# Patient Record
Sex: MALE | Race: WHITE | NOT HISPANIC OR LATINO | Employment: OTHER | URBAN - METROPOLITAN AREA
[De-identification: names, ages, dates, MRNs, and addresses within clinical notes are randomized per-mention and may not be internally consistent; named-entity substitution may affect disease eponyms.]

---

## 2024-02-26 RX ORDER — SILDENAFIL 100 MG/1
TABLET, FILM COATED ORAL
Qty: 18 TABLET | Refills: 3 | OUTPATIENT
Start: 2024-02-26

## 2024-08-27 ENCOUNTER — HOSPITAL ENCOUNTER (EMERGENCY)
Facility: HOSPITAL | Age: 74
Discharge: HOME | End: 2024-08-27
Attending: STUDENT IN AN ORGANIZED HEALTH CARE EDUCATION/TRAINING PROGRAM
Payer: MEDICARE

## 2024-08-27 ENCOUNTER — APPOINTMENT (OUTPATIENT)
Dept: RADIOLOGY | Facility: HOSPITAL | Age: 74
End: 2024-08-27
Payer: MEDICARE

## 2024-08-27 VITALS
BODY MASS INDEX: 30.66 KG/M2 | TEMPERATURE: 97.2 F | OXYGEN SATURATION: 94 % | HEIGHT: 69 IN | WEIGHT: 207 LBS | RESPIRATION RATE: 16 BRPM | HEART RATE: 67 BPM | DIASTOLIC BLOOD PRESSURE: 83 MMHG | SYSTOLIC BLOOD PRESSURE: 118 MMHG

## 2024-08-27 DIAGNOSIS — R29.6 RECURRENT FALLS: ICD-10-CM

## 2024-08-27 DIAGNOSIS — M25.552 LEFT HIP PAIN: Primary | ICD-10-CM

## 2024-08-27 DIAGNOSIS — Z86.69 HISTORY OF PARKINSON'S DISEASE: ICD-10-CM

## 2024-08-27 LAB
ABO GROUP (TYPE) IN BLOOD: NORMAL
ALBUMIN SERPL BCP-MCNC: 3.8 G/DL (ref 3.4–5)
ALP SERPL-CCNC: 79 U/L (ref 33–136)
ALT SERPL W P-5'-P-CCNC: <3 U/L (ref 10–52)
ANION GAP SERPL CALC-SCNC: 8 MMOL/L (ref 10–20)
ANTIBODY SCREEN: NORMAL
APPEARANCE UR: CLEAR
AST SERPL W P-5'-P-CCNC: 11 U/L (ref 9–39)
BASOPHILS # BLD AUTO: 0.05 X10*3/UL (ref 0–0.1)
BASOPHILS NFR BLD AUTO: 0.8 %
BILIRUB SERPL-MCNC: 1.4 MG/DL (ref 0–1.2)
BILIRUB UR STRIP.AUTO-MCNC: NEGATIVE MG/DL
BUN SERPL-MCNC: 16 MG/DL (ref 6–23)
CALCIUM SERPL-MCNC: 8.4 MG/DL (ref 8.6–10.3)
CHLORIDE SERPL-SCNC: 104 MMOL/L (ref 98–107)
CO2 SERPL-SCNC: 29 MMOL/L (ref 21–32)
COLOR UR: YELLOW
CREAT SERPL-MCNC: 0.94 MG/DL (ref 0.5–1.3)
EGFRCR SERPLBLD CKD-EPI 2021: 87 ML/MIN/1.73M*2
EOSINOPHIL # BLD AUTO: 0.09 X10*3/UL (ref 0–0.4)
EOSINOPHIL NFR BLD AUTO: 1.5 %
ERYTHROCYTE [DISTWIDTH] IN BLOOD BY AUTOMATED COUNT: 13.6 % (ref 11.5–14.5)
GLUCOSE SERPL-MCNC: 91 MG/DL (ref 74–99)
GLUCOSE UR STRIP.AUTO-MCNC: NORMAL MG/DL
HCT VFR BLD AUTO: 40.8 % (ref 41–52)
HGB BLD-MCNC: 14 G/DL (ref 13.5–17.5)
HYALINE CASTS #/AREA URNS AUTO: ABNORMAL /LPF
IMM GRANULOCYTES # BLD AUTO: 0.04 X10*3/UL (ref 0–0.5)
IMM GRANULOCYTES NFR BLD AUTO: 0.7 % (ref 0–0.9)
INR PPP: 1.2 (ref 0.9–1.1)
KETONES UR STRIP.AUTO-MCNC: NEGATIVE MG/DL
LEUKOCYTE ESTERASE UR QL STRIP.AUTO: NEGATIVE
LYMPHOCYTES # BLD AUTO: 0.94 X10*3/UL (ref 0.8–3)
LYMPHOCYTES NFR BLD AUTO: 15.7 %
MCH RBC QN AUTO: 32.8 PG (ref 26–34)
MCHC RBC AUTO-ENTMCNC: 34.3 G/DL (ref 32–36)
MCV RBC AUTO: 96 FL (ref 80–100)
MONOCYTES # BLD AUTO: 0.61 X10*3/UL (ref 0.05–0.8)
MONOCYTES NFR BLD AUTO: 10.2 %
MUCOUS THREADS #/AREA URNS AUTO: ABNORMAL /LPF
NEUTROPHILS # BLD AUTO: 4.26 X10*3/UL (ref 1.6–5.5)
NEUTROPHILS NFR BLD AUTO: 71.1 %
NITRITE UR QL STRIP.AUTO: NEGATIVE
NRBC BLD-RTO: 0 /100 WBCS (ref 0–0)
PH UR STRIP.AUTO: 6 [PH]
PLATELET # BLD AUTO: 177 X10*3/UL (ref 150–450)
POTASSIUM SERPL-SCNC: 3.7 MMOL/L (ref 3.5–5.3)
PROT SERPL-MCNC: 6.2 G/DL (ref 6.4–8.2)
PROT UR STRIP.AUTO-MCNC: ABNORMAL MG/DL
PROTHROMBIN TIME: 13.6 SECONDS (ref 9.8–12.8)
RBC # BLD AUTO: 4.27 X10*6/UL (ref 4.5–5.9)
RBC # UR STRIP.AUTO: NEGATIVE /UL
RBC #/AREA URNS AUTO: ABNORMAL /HPF
RH FACTOR (ANTIGEN D): NORMAL
SODIUM SERPL-SCNC: 137 MMOL/L (ref 136–145)
SP GR UR STRIP.AUTO: 1.03
UROBILINOGEN UR STRIP.AUTO-MCNC: ABNORMAL MG/DL
WBC # BLD AUTO: 6 X10*3/UL (ref 4.4–11.3)
WBC #/AREA URNS AUTO: ABNORMAL /HPF

## 2024-08-27 PROCEDURE — 72131 CT LUMBAR SPINE W/O DYE: CPT

## 2024-08-27 PROCEDURE — 72131 CT LUMBAR SPINE W/O DYE: CPT | Mod: FOREIGN READ | Performed by: RADIOLOGY

## 2024-08-27 PROCEDURE — 2500000004 HC RX 250 GENERAL PHARMACY W/ HCPCS (ALT 636 FOR OP/ED): Performed by: STUDENT IN AN ORGANIZED HEALTH CARE EDUCATION/TRAINING PROGRAM

## 2024-08-27 PROCEDURE — 81001 URINALYSIS AUTO W/SCOPE: CPT | Performed by: STUDENT IN AN ORGANIZED HEALTH CARE EDUCATION/TRAINING PROGRAM

## 2024-08-27 PROCEDURE — 73700 CT LOWER EXTREMITY W/O DYE: CPT | Mod: LEFT SIDE | Performed by: RADIOLOGY

## 2024-08-27 PROCEDURE — 85610 PROTHROMBIN TIME: CPT | Performed by: STUDENT IN AN ORGANIZED HEALTH CARE EDUCATION/TRAINING PROGRAM

## 2024-08-27 PROCEDURE — 80053 COMPREHEN METABOLIC PANEL: CPT | Performed by: STUDENT IN AN ORGANIZED HEALTH CARE EDUCATION/TRAINING PROGRAM

## 2024-08-27 PROCEDURE — 86901 BLOOD TYPING SEROLOGIC RH(D): CPT | Performed by: STUDENT IN AN ORGANIZED HEALTH CARE EDUCATION/TRAINING PROGRAM

## 2024-08-27 PROCEDURE — 85025 COMPLETE CBC W/AUTO DIFF WBC: CPT | Performed by: STUDENT IN AN ORGANIZED HEALTH CARE EDUCATION/TRAINING PROGRAM

## 2024-08-27 PROCEDURE — 73700 CT LOWER EXTREMITY W/O DYE: CPT | Mod: LT

## 2024-08-27 PROCEDURE — 73502 X-RAY EXAM HIP UNI 2-3 VIEWS: CPT | Mod: LT

## 2024-08-27 PROCEDURE — 73502 X-RAY EXAM HIP UNI 2-3 VIEWS: CPT | Mod: LEFT SIDE | Performed by: RADIOLOGY

## 2024-08-27 PROCEDURE — 96374 THER/PROPH/DIAG INJ IV PUSH: CPT

## 2024-08-27 PROCEDURE — 36415 COLL VENOUS BLD VENIPUNCTURE: CPT | Performed by: STUDENT IN AN ORGANIZED HEALTH CARE EDUCATION/TRAINING PROGRAM

## 2024-08-27 PROCEDURE — 96375 TX/PRO/DX INJ NEW DRUG ADDON: CPT

## 2024-08-27 PROCEDURE — 99285 EMERGENCY DEPT VISIT HI MDM: CPT | Mod: 25

## 2024-08-27 RX ORDER — METHOCARBAMOL 500 MG/1
500 TABLET, FILM COATED ORAL 2 TIMES DAILY
Qty: 10 TABLET | Refills: 0 | Status: SHIPPED | OUTPATIENT
Start: 2024-08-27 | End: 2024-09-01

## 2024-08-27 RX ORDER — KETOROLAC TROMETHAMINE 30 MG/ML
15 INJECTION, SOLUTION INTRAMUSCULAR; INTRAVENOUS ONCE
Status: COMPLETED | OUTPATIENT
Start: 2024-08-27 | End: 2024-08-27

## 2024-08-27 RX ORDER — FENTANYL CITRATE 50 UG/ML
50 INJECTION, SOLUTION INTRAMUSCULAR; INTRAVENOUS ONCE
Status: COMPLETED | OUTPATIENT
Start: 2024-08-27 | End: 2024-08-27

## 2024-08-27 RX ORDER — ACETAMINOPHEN 325 MG/1
975 TABLET ORAL ONCE
Status: COMPLETED | OUTPATIENT
Start: 2024-08-27 | End: 2024-08-27

## 2024-08-27 ASSESSMENT — PAIN DESCRIPTION - PROGRESSION: CLINICAL_PROGRESSION: NOT CHANGED

## 2024-08-27 ASSESSMENT — COLUMBIA-SUICIDE SEVERITY RATING SCALE - C-SSRS
1. IN THE PAST MONTH, HAVE YOU WISHED YOU WERE DEAD OR WISHED YOU COULD GO TO SLEEP AND NOT WAKE UP?: NO
2. HAVE YOU ACTUALLY HAD ANY THOUGHTS OF KILLING YOURSELF?: NO
6. HAVE YOU EVER DONE ANYTHING, STARTED TO DO ANYTHING, OR PREPARED TO DO ANYTHING TO END YOUR LIFE?: NO

## 2024-08-27 ASSESSMENT — LIFESTYLE VARIABLES
EVER FELT BAD OR GUILTY ABOUT YOUR DRINKING: NO
HAVE YOU EVER FELT YOU SHOULD CUT DOWN ON YOUR DRINKING: NO
HAVE PEOPLE ANNOYED YOU BY CRITICIZING YOUR DRINKING: NO
EVER HAD A DRINK FIRST THING IN THE MORNING TO STEADY YOUR NERVES TO GET RID OF A HANGOVER: NO
TOTAL SCORE: 0

## 2024-08-27 ASSESSMENT — PAIN SCALES - GENERAL
PAINLEVEL_OUTOF10: 3
PAINLEVEL_OUTOF10: 0 - NO PAIN

## 2024-08-27 ASSESSMENT — PAIN - FUNCTIONAL ASSESSMENT: PAIN_FUNCTIONAL_ASSESSMENT: 0-10

## 2024-08-27 NOTE — ED PROVIDER NOTES
"    HPI   Chief Complaint   Patient presents with    Fall     PT STATES THAT HE HAS FELL 3 TIMES TODAY, C/O LEFT HIP AND RIGHT ELBOW PAIN. DENIES HITTING HEAD, 0 LOC       HPI: Patient is a 73-year-old male, he has a history of Parkinson's disease with frequent falls, history of a chronic cough, GERD, House's esophagus, lung nodules, he is presenting to the emergency department after a fall with left hip pain.  Patient reports multiple mechanical falls today onto his left hip.  Did not hit his head or lose consciousness.  Patient is complaining of left hip pain.  Denies any paresthesias.  States he was having too much pain to get up and walk after the last fall.  No chest or abdominal pain, endorses his chronic back pain which is not worse than usual.      ROS: Complete 12 point review of systems performed, otherwise negative except as noted in the history of present illness    PMH: Reviewed, documented below in note. Pertinents in HPI  PSH: Reviewed and documented below in note. Pertinents in HPI  SH: Lives at home with his wife.  No illicits  Fam: Reviewed, noncontributory to patients current complaint  MEDS: Reviewed and documented below in note. Pertinents in HPI  ALLERGIES: Reviewed and documented below in note.          History provided by:  Patient   used: No                          No data recorded                Patient History   No past medical history on file.  No past surgical history on file.  No family history on file.  Social History     Tobacco Use    Smoking status: Not on file    Smokeless tobacco: Not on file   Substance Use Topics    Alcohol use: Not on file    Drug use: Not on file       Physical Exam   Visit Vitals  /83   Pulse 67   Temp 36.2 °C (97.2 °F)   Resp 16   Ht 1.753 m (5' 9\")   Wt 93.9 kg (207 lb)   SpO2 94%   BMI 30.57 kg/m²   BSA 2.14 m²      Physical Exam  Vitals and nursing note reviewed.   Constitutional:       Appearance: Normal appearance.   HENT: "      Head: Normocephalic and atraumatic.   Neck:      Vascular: No carotid bruit.   Cardiovascular:      Rate and Rhythm: Normal rate and regular rhythm.      Pulses: Normal pulses.      Heart sounds: Normal heart sounds.   Pulmonary:      Effort: Pulmonary effort is normal.      Breath sounds: Normal breath sounds.   Abdominal:      General: There is no distension.      Palpations: Abdomen is soft.      Tenderness: There is no abdominal tenderness. There is no guarding or rebound.   Musculoskeletal:         General: Tenderness present. No deformity or signs of injury.      Cervical back: Normal range of motion. No rigidity.      Comments: Patient has tenderness to palpation in the left groin.  Pain with flexion of the left hip.  No pain with internal or external rotation.  Neurovascularly intact distally.   Skin:     General: Skin is warm and dry.      Capillary Refill: Capillary refill takes less than 2 seconds.   Neurological:      General: No focal deficit present.      Mental Status: He is alert and oriented to person, place, and time.      Sensory: No sensory deficit.      Motor: No weakness.   Psychiatric:         Mood and Affect: Mood normal.         Behavior: Behavior normal.         CT hip left wo IV contrast   Final Result   Left hip hemiarthroplasty in good alignment.  No acute bony injury   identified.   Hypertrophic changes of the acetabulum and heterotopic bone adjacent   to the greater trochanter.   Left SI joint arthropathy..   Signed by Wai Garcia MD      CT lumbar spine wo IV contrast   Final Result   Multilevel fusion extending from L3 through S1.  Interbody disc   spacers are in place.  No evidence of hardware complication.    Associated chronic changes in the posterior elements.  No acute   vertebral body fracture or subluxation.   .   Signed by Paul Quick MD      XR hip left with pelvis when performed 2 or 3 views   Final Result   No acute findings.        MACRO:   None         Signed by: Jos Ramirez 8/27/2024 3:43 PM   Dictation workstation:   [id number]          Labs Reviewed   CBC WITH AUTO DIFFERENTIAL - Abnormal       Result Value    WBC 6.0      nRBC 0.0      RBC 4.27 (*)     Hemoglobin 14.0      Hematocrit 40.8 (*)     MCV 96      MCH 32.8      MCHC 34.3      RDW 13.6      Platelets 177      Neutrophils % 71.1      Immature Granulocytes %, Automated 0.7      Lymphocytes % 15.7      Monocytes % 10.2      Eosinophils % 1.5      Basophils % 0.8      Neutrophils Absolute 4.26      Immature Granulocytes Absolute, Automated 0.04      Lymphocytes Absolute 0.94      Monocytes Absolute 0.61      Eosinophils Absolute 0.09      Basophils Absolute 0.05     COMPREHENSIVE METABOLIC PANEL - Abnormal    Glucose 91      Sodium 137      Potassium 3.7      Chloride 104      Bicarbonate 29      Anion Gap 8 (*)     Urea Nitrogen 16      Creatinine 0.94      eGFR 87      Calcium 8.4 (*)     Albumin 3.8      Alkaline Phosphatase 79      Total Protein 6.2 (*)     AST 11      Bilirubin, Total 1.4 (*)     ALT <3 (*)    PROTIME-INR - Abnormal    Protime 13.6 (*)     INR 1.2 (*)    URINALYSIS WITH REFLEX MICROSCOPIC - Abnormal    Color, Urine Yellow      Appearance, Urine Clear      Specific Gravity, Urine 1.027      pH, Urine 6.0      Protein, Urine 20 (TRACE)      Glucose, Urine Normal      Blood, Urine NEGATIVE      Ketones, Urine NEGATIVE      Bilirubin, Urine NEGATIVE      Urobilinogen, Urine 3 (1+) (*)     Nitrite, Urine NEGATIVE      Leukocyte Esterase, Urine NEGATIVE     MICROSCOPIC ONLY, URINE - Abnormal    WBC, Urine NONE      RBC, Urine 1-2      Mucus, Urine 2+      Hyaline Casts, Urine 1+ (*)    TYPE AND SCREEN    ABO TYPE A      Rh TYPE POS      ANTIBODY SCREEN NEG           ED Course & MDM   Diagnoses as of 08/29/24 1029   Left hip pain   Recurrent falls   History of Parkinson's disease           Medical Decision Making  All mentioned lab results, ECGs, and imaging were independently reviewed  by myself  - Patient evaluated. Patient is presenting to the emergency department after a fall, he is having left hip pain.  Differential includes but is not limited to potential fracture, periprosthetic, dislocation, musculoskeletal strain or sprain, or underlying hematoma or bruising.  Imaging was obtained, I do want to obtain labs to rule out infection, dehydration, anemia as a potential cause for the patient's symptoms.  Analgesia was provided here in the emergency department.  X-ray demonstrates no acute findings.  Patient was signed out to the oncoming team pending the results of his labs and a reevaluation of his pain with the x-ray being negative for ultimate disposition.    - Monitored for any changes in stability or symptomatology. Patient remained stable.       *Disclaimer: This note was dictated by speech recognition. Minor errors in transcription may be present. Please call with questions.    Gavin Kim MD             Your medication list        START taking these medications        Instructions Last Dose Given Next Dose Due   methocarbamol 500 mg tablet  Commonly known as: Robaxin      Take 1 tablet (500 mg) by mouth 2 times a day for 5 days.                 Where to Get Your Medications        These medications were sent to [id number] - [place], [place] - [address]  [address] [place] [id number]      Phone: [phone]   methocarbamol 500 mg tablet         Procedure  Procedures     *This report was transcribed using voice recognition software.  Every effort was made to ensure accuracy; however, inadvertent computerized transcription errors may be present.*  Javier Kim MD  08/29/24         Javier Kim MD  08/29/24 5712

## 2024-08-27 NOTE — PROGRESS NOTES
Emergency Medicine Transition of Care Note.    I received Lance Mcgrath in signout from Dr. Kim.  Please see the previous ED provider note for all HPI, PE and MDM up to the time of signout. This is in addition to the primary record.    In brief Lance Mcgrath is an 73 y.o. male presenting for fall prior to arrival.  Patient with history of Parkinson's, has reportedly had multiple falls recently.   mechanical fall earlier today onto the left hip, continued pain with range of motion.  X-ray negative for any acute fracture.  Patient given analgesia, and will attempt to ambulate.  If unable to ambulate, will CT head to rule out occult fracture.  Lab workup also initiated to rule out electrode abnormality, underlying infection, UA pending at this time.  Chief Complaint   Patient presents with    Fall     PT STATES THAT HE HAS FELL 3 TIMES TODAY, C/O LEFT HIP AND RIGHT ELBOW PAIN. DENIES HITTING HEAD, 0 LOC        Diagnoses as of 08/28/24 0236   Left hip pain   Recurrent falls   History of Parkinson's disease       Medical Decision Making  Patient endorsing continued inability to ambulate after XR showing no acute fracture, CT hip and lumbar spine obtained to rule out occult fracture. No acute traumatic injury identified on CT hip or lumbar spine. Patient given analgesia and able to bear weight on that hip. Patient offered nursing facility placement via social work in the morning given reported pain with ambulation, and initially patient and wife were agreeable to placement, however after several repeat discussions at their request, he and his wife feel they have adequate equipment at home and would prefer to be discharged. Patient's wife states they have equipment including rollator, walker, and electronic stair chair at home that patient can use to assist with ambulation if needed. There are no steps to get into the house. Patient ambulated around the department with a walker with a steady gait without other  mobility aid. Will discharge with follow up and strict return precautions. Patient and wife voiced understanding and agreement with plan.    Final diagnoses:   None       Marcelina Machado MD

## 2024-08-28 NOTE — DISCHARGE INSTRUCTIONS
You were seen in the emergency department with left hip pain after recurrent falls.  Your x-ray and CT showed no evidence of any new fractures.  We offered placement in a nursing facility, however you indicated that you have sufficient equipment and care at home at this time.  We recommend using a walker and other supportive devices for ambulation at all times. Please follow up with your primary care provider in the next few days for reevaluation.    Please do not drive or operate any heavy machinery within 8 hours of taking the medication you were prescribed with.  You may use lidocaine patches on the area you are having most pain, and leave them on for 12 hours at a time followed by 12 hours off.    If you feel unsteady on your feet or have continued falls, or wish to be placed in a nursing facility, please return to the emergency department.

## 2024-12-25 ENCOUNTER — APPOINTMENT (OUTPATIENT)
Dept: RADIOLOGY | Facility: HOSPITAL | Age: 74
End: 2024-12-25
Payer: MEDICARE

## 2024-12-25 ENCOUNTER — HOSPITAL ENCOUNTER (EMERGENCY)
Facility: HOSPITAL | Age: 74
Discharge: HOME | End: 2024-12-26
Attending: STUDENT IN AN ORGANIZED HEALTH CARE EDUCATION/TRAINING PROGRAM
Payer: MEDICARE

## 2024-12-25 DIAGNOSIS — Z86.69 HISTORY OF PARKINSON'S DISEASE: ICD-10-CM
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
## 2024-12-25 LAB
ALBUMIN SERPL BCP-MCNC: 3.7 G/DL (ref 3.4–5)
ALP SERPL-CCNC: 92 U/L (ref 33–136)
ALT SERPL W P-5'-P-CCNC: 3 U/L (ref 10–52)
ANION GAP SERPL CALC-SCNC: 9 MMOL/L (ref 10–20)
AST SERPL W P-5'-P-CCNC: 19 U/L (ref 9–39)
BASOPHILS # BLD AUTO: 0.04 X10*3/UL (ref 0–0.1)
BASOPHILS NFR BLD AUTO: 0.8 %
BILIRUB SERPL-MCNC: 0.5 MG/DL (ref 0–1.2)
BUN SERPL-MCNC: 11 MG/DL (ref 6–23)
CALCIUM SERPL-MCNC: 8.4 MG/DL (ref 8.6–10.3)
CARDIAC TROPONIN I PNL SERPL HS: 5 NG/L (ref 0–20)
CHLORIDE SERPL-SCNC: 107 MMOL/L (ref 98–107)
CO2 SERPL-SCNC: 28 MMOL/L (ref 21–32)
CREAT SERPL-MCNC: 0.99 MG/DL (ref 0.5–1.3)
EGFRCR SERPLBLD CKD-EPI 2021: 81 ML/MIN/1.73M*2
EOSINOPHIL # BLD AUTO: 0.16 X10*3/UL (ref 0–0.4)
EOSINOPHIL NFR BLD AUTO: 3.3 %
ERYTHROCYTE [DISTWIDTH] IN BLOOD BY AUTOMATED COUNT: 13.9 % (ref 11.5–14.5)
GLUCOSE SERPL-MCNC: 110 MG/DL (ref 74–99)
HCT VFR BLD AUTO: 42.6 % (ref 41–52)
HGB BLD-MCNC: 14.5 G/DL (ref 13.5–17.5)
IMM GRANULOCYTES # BLD AUTO: 0.01 X10*3/UL (ref 0–0.5)
IMM GRANULOCYTES NFR BLD AUTO: 0.2 % (ref 0–0.9)
LYMPHOCYTES # BLD AUTO: 1.19 X10*3/UL (ref 0.8–3)
LYMPHOCYTES NFR BLD AUTO: 24.5 %
MCH RBC QN AUTO: 32.6 PG (ref 26–34)
MCHC RBC AUTO-ENTMCNC: 34 G/DL (ref 32–36)
MCV RBC AUTO: 96 FL (ref 80–100)
MONOCYTES # BLD AUTO: 0.53 X10*3/UL (ref 0.05–0.8)
MONOCYTES NFR BLD AUTO: 10.9 %
NEUTROPHILS # BLD AUTO: 2.93 X10*3/UL (ref 1.6–5.5)
NEUTROPHILS NFR BLD AUTO: 60.3 %
NRBC BLD-RTO: 0 /100 WBCS (ref 0–0)
PLATELET # BLD AUTO: 212 X10*3/UL (ref 150–450)
POTASSIUM SERPL-SCNC: 3.7 MMOL/L (ref 3.5–5.3)
PROT SERPL-MCNC: 6.1 G/DL (ref 6.4–8.2)
RBC # BLD AUTO: 4.45 X10*6/UL (ref 4.5–5.9)
SODIUM SERPL-SCNC: 140 MMOL/L (ref 136–145)
WBC # BLD AUTO: 4.9 X10*3/UL (ref 4.4–11.3)

## 2024-12-25 PROCEDURE — 71250 CT THORAX DX C-: CPT | Performed by: RADIOLOGY

## 2024-12-25 PROCEDURE — 96374 THER/PROPH/DIAG INJ IV PUSH: CPT

## 2024-12-25 PROCEDURE — 72131 CT LUMBAR SPINE W/O DYE: CPT | Mod: RSC

## 2024-12-25 PROCEDURE — 72128 CT CHEST SPINE W/O DYE: CPT | Mod: RSC

## 2024-12-25 PROCEDURE — 84484 ASSAY OF TROPONIN QUANT: CPT | Performed by: STUDENT IN AN ORGANIZED HEALTH CARE EDUCATION/TRAINING PROGRAM

## 2024-12-25 PROCEDURE — 70450 CT HEAD/BRAIN W/O DYE: CPT | Performed by: RADIOLOGY

## 2024-12-25 PROCEDURE — 99285 EMERGENCY DEPT VISIT HI MDM: CPT | Mod: 25 | Performed by: STUDENT IN AN ORGANIZED HEALTH CARE EDUCATION/TRAINING PROGRAM

## 2024-12-25 PROCEDURE — 85025 COMPLETE CBC W/AUTO DIFF WBC: CPT | Performed by: STUDENT IN AN ORGANIZED HEALTH CARE EDUCATION/TRAINING PROGRAM

## 2024-12-25 PROCEDURE — 72131 CT LUMBAR SPINE W/O DYE: CPT | Performed by: RADIOLOGY

## 2024-12-25 PROCEDURE — 74176 CT ABD & PELVIS W/O CONTRAST: CPT

## 2024-12-25 PROCEDURE — 36415 COLL VENOUS BLD VENIPUNCTURE: CPT | Performed by: STUDENT IN AN ORGANIZED HEALTH CARE EDUCATION/TRAINING PROGRAM

## 2024-12-25 PROCEDURE — 72128 CT CHEST SPINE W/O DYE: CPT | Performed by: RADIOLOGY

## 2024-12-25 PROCEDURE — 93005 ELECTROCARDIOGRAM TRACING: CPT

## 2024-12-25 PROCEDURE — 72125 CT NECK SPINE W/O DYE: CPT | Performed by: RADIOLOGY

## 2024-12-25 PROCEDURE — 74176 CT ABD & PELVIS W/O CONTRAST: CPT | Performed by: RADIOLOGY

## 2024-12-25 PROCEDURE — 2500000004 HC RX 250 GENERAL PHARMACY W/ HCPCS (ALT 636 FOR OP/ED): Performed by: STUDENT IN AN ORGANIZED HEALTH CARE EDUCATION/TRAINING PROGRAM

## 2024-12-25 PROCEDURE — 73130 X-RAY EXAM OF HAND: CPT | Mod: LEFT SIDE | Performed by: STUDENT IN AN ORGANIZED HEALTH CARE EDUCATION/TRAINING PROGRAM

## 2024-12-25 PROCEDURE — 72125 CT NECK SPINE W/O DYE: CPT

## 2024-12-25 PROCEDURE — 73130 X-RAY EXAM OF HAND: CPT | Mod: LT

## 2024-12-25 PROCEDURE — 84075 ASSAY ALKALINE PHOSPHATASE: CPT | Performed by: STUDENT IN AN ORGANIZED HEALTH CARE EDUCATION/TRAINING PROGRAM

## 2024-12-25 PROCEDURE — 70450 CT HEAD/BRAIN W/O DYE: CPT

## 2024-12-25 RX ORDER — KETOROLAC TROMETHAMINE 30 MG/ML
15 INJECTION, SOLUTION INTRAMUSCULAR; INTRAVENOUS ONCE
Status: COMPLETED | OUTPATIENT
Start: 2024-12-25 | End: 2024-12-25

## 2024-12-25 RX ADMIN — KETOROLAC TROMETHAMINE 15 MG: 30 INJECTION, SOLUTION INTRAMUSCULAR at 23:16

## 2024-12-25 ASSESSMENT — COLUMBIA-SUICIDE SEVERITY RATING SCALE - C-SSRS
6. HAVE YOU EVER DONE ANYTHING, STARTED TO DO ANYTHING, OR PREPARED TO DO ANYTHING TO END YOUR LIFE?: NO
2. HAVE YOU ACTUALLY HAD ANY THOUGHTS OF KILLING YOURSELF?: NO
1. IN THE PAST MONTH, HAVE YOU WISHED YOU WERE DEAD OR WISHED YOU COULD GO TO SLEEP AND NOT WAKE UP?: NO

## 2024-12-25 ASSESSMENT — PAIN SCALES - GENERAL
PAINLEVEL_OUTOF10: 6
PAINLEVEL_OUTOF10: 5 - MODERATE PAIN

## 2024-12-25 ASSESSMENT — PAIN - FUNCTIONAL ASSESSMENT: PAIN_FUNCTIONAL_ASSESSMENT: 0-10

## 2024-12-25 ASSESSMENT — PAIN DESCRIPTION - PAIN TYPE: TYPE: ACUTE PAIN

## 2024-12-25 ASSESSMENT — PAIN DESCRIPTION - LOCATION: LOCATION: HAND

## 2024-12-26 ENCOUNTER — APPOINTMENT (OUTPATIENT)
Dept: CARDIOLOGY | Facility: HOSPITAL | Age: 74
End: 2024-12-26
Payer: MEDICARE

## 2024-12-26 VITALS
WEIGHT: 205 LBS | OXYGEN SATURATION: 95 % | BODY MASS INDEX: 30.36 KG/M2 | SYSTOLIC BLOOD PRESSURE: 106 MMHG | DIASTOLIC BLOOD PRESSURE: 71 MMHG | HEIGHT: 69 IN | HEART RATE: 64 BPM | RESPIRATION RATE: 16 BRPM | TEMPERATURE: 97.8 F

## 2024-12-26 NOTE — ED PROVIDER NOTES
HPI   Chief Complaint   Patient presents with    Fall    Hand Pain       HPI  73 yo M with history of Parkinson's disease on carbidopa-levodopa with deep brain stimulator in place and history of recurrent falls presents with mechanical fall tonight. He states he was using his rolator in the garage when his legs gave out from under him. He states he fell directly on his buttocks then struck his head on the concrete of the garage. He held out his left hand to catch himself and now has left hand pain as well as pain in his lower back and a headache. He reports he is not steady on his feet at baseline, and has been using all of his mobility equipment at home. He denies LOC. He is not on anticoagulation. He denies changes in vision, dizziness, syncope, chest pain, shortness of breath, abdominal pain, N/V/D, change in PO intake.      Patient History   No past medical history on file.  No past surgical history on file.  No family history on file.  Social History     Tobacco Use    Smoking status: Not on file    Smokeless tobacco: Not on file   Substance Use Topics    Alcohol use: Not on file    Drug use: Not on file       Physical Exam   ED Triage Vitals [12/25/24 2050]   Temperature Heart Rate Respirations BP   36.6 °C (97.8 °F) 65 16 129/82      Pulse Ox Temp Source Heart Rate Source Patient Position   94 % Oral -- --      BP Location FiO2 (%)     -- --       Physical Exam  Vitals reviewed.   HENT:      Head: Normocephalic and atraumatic.      Comments: No hemotympanum, no chapman sign, no raccoon eyes     Mouth/Throat:      Pharynx: Oropharynx is clear.   Eyes:      Pupils: Pupils are equal, round, and reactive to light.   Cardiovascular:      Rate and Rhythm: Normal rate and regular rhythm.   Pulmonary:      Effort: Pulmonary effort is normal.      Breath sounds: Normal breath sounds.   Abdominal:      Palpations: Abdomen is soft.      Tenderness: There is no abdominal tenderness. There is no guarding or rebound.    Musculoskeletal:      Cervical back: Normal range of motion. Tenderness (+ midline lumbar TTP. No cervical or thoracic TTP) present.      Comments: Pill rolling tremor in bilateral upper extremities at rest   Skin:     General: Skin is warm and dry.      Comments: Erythema to left 3rd - 5th MCPs of left hand. No open wounds or drainage.   Neurological:      Mental Status: He is alert and oriented to person, place, and time.           ED Course & MDM   ED Course as of 12/25/24 2339   Wed Dec 25, 2024   2142 EKG, interpreted by me: Sinus rhythm, rate 66 bpm.  Normal axis.  No acute ST elevation or depression.  No acute T wave abnormality.  QTc 413.  No evidence of STEMI at this time. [JG]   2245 XR left hand IMPRESSION: Questionable nondisplaced fracture of the distal aspect of mid 5th phalanx. Correlation with point tenderness recommended.    No point tenderness to distal 5th phalanx. Slight tenderness to palpation of 5th MCP. [JG]   2300 CT chest abdomen pelvis IMPRESSION:  1. No acute traumatic cardiopulmonary or abdominal/pelvic abnormality. Acute osseous abnormality of the thoracic or lumbar spine.  2. Debris within the right central airways raising concern for aspiration.  3. Linear right greater than left airspace opacities, likely subsegmental atelectasis/scarring.  4. Cholelithiasis.  5. Incidental findings and exam limitations as above.   [JG]      ED Course User Index  [JG] Marcelina Machado MD         Diagnoses as of 12/25/24 2339   Fall, initial encounter   History of Parkinson's disease                 No data recorded     Chavez Coma Scale Score: 15 (12/25/24 2304 : Gonzalo Soto RN)                     Medical Decision Making  75 yo M with history of Parkinson's disease on carbidopa-levodopa with deep brain stimulator in place and history of recurrent falls presents with mechanical fall tonight. He states he was using his rolator in the garage when his legs gave out from under him. He states he  fell directly on his buttocks then struck his head on the concrete of the garage. He held out his left hand to catch himself and now has left hand pain as well as pain in his lower back and a headache. No LOC. Not on anticoagulation. On exam, well appearing and nontoxic. Vitals stable, normotensive, not tachycardic, not tachypneic. Afebrile. Lungs CTAB. Abdomen soft and nontender in all quadrants, no rebound rigidity or guarding, no CVA tenderness. No focal neurologic deficits. Only evidence of injury on examination is + midline thoracic tenderness to palpation without step offs or deformities, and erythema to the left 3rd-5th MCPs of left hand. Full ROM of left hand, sensation intact, radial pulse 2+. Able to make a fist. No open wounds or drainage. Workup today showing no significant leukocytosis. No anemia. No significant electrolyte derangement. Renal function at baseline. Troponin negative. EKG nonischemic. XR left hand showing questionable nondisplaced fracture of the distal aspect of mid 5th phalanx per radiology, this is a very subtle finding I am not able to appreciate on imaging. Clinically, no point tenderness to distal 5th phalanx. Slight tenderness to palpation of 5th MCP.    Patient signed out to oncoming provider Dr. Kumar pending CT imaging, reevaluation, and disposition.      Procedure  Procedures     Marcelina Machado MD  01/05/25 1630

## 2024-12-26 NOTE — ED TRIAGE NOTES
Patient was in garage and had mechanical fall. He has history of Parkinsons and uses cane. Patient hit head. No LOC or thinners. C/o left hand pain and headache.

## 2024-12-26 NOTE — PROGRESS NOTES
Patient care signed out to me by Dr. Machado. CT head, whole spine, chest abdomen pelvis read as negative for acute findings.  Metabolic panel reviewed and unremarkable.  Metabolic panel shows no white count, no anemia. Patient able to ambulate by himself with a rollator.  Patient is asking to go home.  Had a discussion with the patient about safety concerns at home.    No indication for admission.  Discussed findings and diagnosis with the patient, follow-up and return to ED precautions given, patient voiced understanding, agrees with plan, questions answered, patient was discharged in stable condition.    MDM/ED Course  ED Course as of 12/26/24 0235   Wed Dec 25, 2024   2142 EKG, interpreted by me: Sinus rhythm, rate 66 bpm.  Normal axis.  No acute ST elevation or depression.  No acute T wave abnormality.  QTc 413.  No evidence of STEMI at this time. [JG]   2245 XR left hand IMPRESSION: Questionable nondisplaced fracture of the distal aspect of mid 5th phalanx. Correlation with point tenderness recommended.    No point tenderness to distal 5th phalanx. Slight tenderness to palpation of 5th MCP. [JG]   2300 CT chest abdomen pelvis IMPRESSION:  1. No acute traumatic cardiopulmonary or abdominal/pelvic abnormality. Acute osseous abnormality of the thoracic or lumbar spine.  2. Debris within the right central airways raising concern for aspiration.  3. Linear right greater than left airspace opacities, likely subsegmental atelectasis/scarring.  4. Cholelithiasis.  5. Incidental findings and exam limitations as above.   [JG]      ED Course User Index  [JG] Marcelina Machado MD         Diagnoses as of 12/26/24 0235   Fall, initial encounter   History of Parkinson's disease

## 2024-12-28 LAB
ATRIAL RATE: 66 BPM
P AXIS: 27 DEGREES
PR INTERVAL: 250 MS
Q ONSET: 253 MS
QRS COUNT: 11 BEATS
QRS DURATION: 100 MS
QT INTERVAL: 394 MS
QTC CALCULATION(BAZETT): 413 MS
QTC FREDERICIA: 407 MS
R AXIS: 37 DEGREES
T AXIS: 55 DEGREES
T OFFSET: 450 MS
VENTRICULAR RATE: 66 BPM

## 2025-02-22 ENCOUNTER — APPOINTMENT (OUTPATIENT)
Dept: RADIOLOGY | Facility: HOSPITAL | Age: 75
End: 2025-02-22
Payer: MEDICARE

## 2025-02-22 ENCOUNTER — HOSPITAL ENCOUNTER (EMERGENCY)
Facility: HOSPITAL | Age: 75
Discharge: HOME | End: 2025-02-22
Attending: STUDENT IN AN ORGANIZED HEALTH CARE EDUCATION/TRAINING PROGRAM
Payer: MEDICARE

## 2025-02-22 VITALS
RESPIRATION RATE: 17 BRPM | HEIGHT: 65 IN | TEMPERATURE: 98 F | BODY MASS INDEX: 33.66 KG/M2 | HEART RATE: 65 BPM | WEIGHT: 202 LBS | OXYGEN SATURATION: 91 % | DIASTOLIC BLOOD PRESSURE: 65 MMHG | SYSTOLIC BLOOD PRESSURE: 112 MMHG

## 2025-02-22 DIAGNOSIS — R29.6 RECURRENT FALLS: ICD-10-CM

## 2025-02-22 DIAGNOSIS — S09.90XA HEAD INJURY, INITIAL ENCOUNTER: Primary | ICD-10-CM

## 2025-02-22 PROCEDURE — 99285 EMERGENCY DEPT VISIT HI MDM: CPT | Mod: 25

## 2025-02-22 PROCEDURE — 70450 CT HEAD/BRAIN W/O DYE: CPT | Performed by: RADIOLOGY

## 2025-02-22 PROCEDURE — 72125 CT NECK SPINE W/O DYE: CPT

## 2025-02-22 PROCEDURE — 70450 CT HEAD/BRAIN W/O DYE: CPT

## 2025-02-22 PROCEDURE — 99284 EMERGENCY DEPT VISIT MOD MDM: CPT | Mod: 25 | Performed by: STUDENT IN AN ORGANIZED HEALTH CARE EDUCATION/TRAINING PROGRAM

## 2025-02-22 ASSESSMENT — PAIN - FUNCTIONAL ASSESSMENT
PAIN_FUNCTIONAL_ASSESSMENT: 0-10
PAIN_FUNCTIONAL_ASSESSMENT: 0-10

## 2025-02-22 ASSESSMENT — LIFESTYLE VARIABLES
EVER FELT BAD OR GUILTY ABOUT YOUR DRINKING: NO
HAVE PEOPLE ANNOYED YOU BY CRITICIZING YOUR DRINKING: NO
HAVE YOU EVER FELT YOU SHOULD CUT DOWN ON YOUR DRINKING: NO
EVER HAD A DRINK FIRST THING IN THE MORNING TO STEADY YOUR NERVES TO GET RID OF A HANGOVER: NO
TOTAL SCORE: 0

## 2025-02-22 ASSESSMENT — PAIN SCALES - GENERAL
PAINLEVEL_OUTOF10: 0 - NO PAIN

## 2025-02-22 NOTE — ED PROVIDER NOTES
"    HPI   Chief Complaint   Patient presents with    Head Injury     Head injury, falls a lot due to parkinsons    Fall     On eliquis       HPI: Patient is a 74-year-old male, he has a history of Parkinson's disease, he is on carbidopa levodopa with a deep brain stimulator in place, he has a history of recurrent falls, he is presenting to the emergency department activated as an HIA.  Patient fell, striking his head, without a loss of consciousness.  He denies any prodromal symptoms to the fall and reports it is typical with his recurrent falls.  He is denying any chest or back pain, no abdominal pain, no shortness of breath.  Denying any blurry vision or double vision, denies any lightheadedness or dizziness.  He is on Eliquis for A-fib.      ROS: Complete review of systems performed, otherwise negative except as noted in the history of present illness    PMH: Reviewed, documented below in note. Pertinents in HPI  PSH: Reviewed and documented below in note. Pertinents in HPI  SH: No tobacco alcohol or illicits.  Lives at home with his wife  Fam: Reviewed, noncontributory to patients current complaint  MEDS: Reviewed and documented below in note. Pertinents in HPI  ALLERGIES: Reviewed and documented below in note.        History provided by:  Patient   used: No                          [place] Coma Scale Score: 15                  Patient History   History reviewed. No pertinent past medical history.  History reviewed. No pertinent surgical history.  No family history on file.  Social History     Tobacco Use    Smoking status: Not on file    Smokeless tobacco: Not on file   Substance Use Topics    Alcohol use: Not on file    Drug use: Not on file       Physical Exam   Visit Vitals  /73   Pulse 67   Temp 36.7 °C (98 °F) (Temporal)   Resp 18   Ht 1.651 m (5' 5\")   Wt 91.6 kg (202 lb)   SpO2 (!) 91%   BMI 33.61 kg/m²   BSA 2.05 m²      Physical Exam  Vitals and nursing note reviewed. "   Constitutional:       Appearance: Normal appearance.   HENT:      Head: Normocephalic and atraumatic.      Nose: No congestion.      Mouth/Throat:      Mouth: Mucous membranes are moist.      Pharynx: Oropharynx is clear.   Eyes:      Extraocular Movements: Extraocular movements intact.      Pupils: Pupils are equal, round, and reactive to light.   Neck:      Vascular: No carotid bruit.   Cardiovascular:      Rate and Rhythm: Normal rate and regular rhythm.      Pulses: Normal pulses.      Heart sounds: Normal heart sounds.   Pulmonary:      Effort: Pulmonary effort is normal.      Breath sounds: Normal breath sounds.   Abdominal:      General: There is no distension.      Palpations: Abdomen is soft.      Tenderness: There is no abdominal tenderness. There is no guarding or rebound.   Musculoskeletal:         General: No tenderness, deformity or signs of injury.      Cervical back: Normal range of motion. No rigidity.      Right lower leg: No edema.      Left lower leg: No edema.   Skin:     General: Skin is warm and dry.      Capillary Refill: Capillary refill takes less than 2 seconds.   Neurological:      General: No focal deficit present.      Mental Status: He is alert and oriented to person, place, and time. Mental status is at baseline.   Psychiatric:         Mood and Affect: Mood normal.         Behavior: Behavior normal.         CT cervical spine wo IV contrast   Final Result   No fracture or acute traumatic dislocation of the cervical spine is   seen.        Nonemergent findings as outlined above.        MACRO:   None        Signed by: Ely Rivers 2/22/2025 10:57 AM   Dictation workstation:   [id number]      CT head wo IV contrast   Final Result   Atrophy and mild chronic microvascular ischemic disease with deep   brain stimulator electrodes unchanged in positioning.        No acute intracranial process.        MACRO:   None             Signed by: Ely Rivers 2/22/2025 10:41 AM   Dictation workstation:    [id number]          Labs Reviewed - No data to display      ED Course & MDM   Diagnoses as of 02/22/25 1147   Head injury, initial encounter   Recurrent falls           Medical Decision Making  All mentioned lab results, ECGs, and imaging were independently reviewed by myself  - Patient evaluated.  Patient is presenting to the emergency department as an HIA.  Patient was immediately taken to the CT scanner and had CTs of the head and C-spine performed which did not reveal any evidence of any acute traumatic pathology or intracranial hemorrhage.  Patient was noted to be intermittently hypoxic here in the emergency department with SpO2 is less than 94%.  He is not having any shortness of breath, he has no evidence of adventitious lung sounds on examination, he is in no acute respiratory distress.  While additional workup with imaging or labs was considered I do not believe is clinically indicated based on the patient's clinical picture in front of me.  I discussed the findings of the patient's imaging with the patient and wife at bedside.  All questions were answered.  Patient feels stable for discharge and is requesting to go home.  I feel this is appropriate.  Patient was discharged otherwise stable condition with strict return precautions.  - Monitored for any changes in stability or symptomatology. Patient remained stable.   - Counseled regarding labs, imaging, diagnosis, and plan. Patient was agreeable. All questions were answered. The patient was receptive and agreeable to the plan of care.   -The patient was instructed to return to the emergency department if any symptoms recurred, worsened, or if there were any additional concerns.    *Disclaimer: This note was dictated by speech recognition. Minor errors in transcription may be present. Please call with questions.    Gavin Kim MD             Your medication list        ASK your doctor about these medications        Instructions Last Dose Given  Next Dose Due   methocarbamol 500 mg tablet  Commonly known as: Robaxin      Take 1 tablet (500 mg) by mouth 2 times a day for 5 days.                Procedure  Critical Care    Performed by: Javier Kim MD  Authorized by: Javier Kim MD    Critical care provider statement:     Critical care time (minutes):  10    Critical care time was exclusive of:  Separately billable procedures and treating other patients    Critical care was necessary to treat or prevent imminent or life-threatening deterioration of the following conditions:  Trauma    Critical care was time spent personally by me on the following activities:  Development of treatment plan with patient or surrogate, examination of patient, obtaining history from patient or surrogate and ordering and review of radiographic studies       *This report was transcribed using voice recognition software.  Every effort was made to ensure accuracy; however, inadvertent computerized transcription errors may be present.*  Javier Kim MD  02/22/25         Javier Kim MD  02/22/25 1147

## 2025-05-13 ENCOUNTER — APPOINTMENT (OUTPATIENT)
Dept: RADIOLOGY | Facility: HOSPITAL | Age: 75
End: 2025-05-13
Payer: MEDICARE

## 2025-05-13 ENCOUNTER — HOSPITAL ENCOUNTER (EMERGENCY)
Facility: HOSPITAL | Age: 75
Discharge: HOME | End: 2025-05-13
Attending: STUDENT IN AN ORGANIZED HEALTH CARE EDUCATION/TRAINING PROGRAM
Payer: MEDICARE

## 2025-05-13 VITALS
RESPIRATION RATE: 16 BRPM | HEIGHT: 69 IN | BODY MASS INDEX: 31.1 KG/M2 | SYSTOLIC BLOOD PRESSURE: 116 MMHG | TEMPERATURE: 98.1 F | DIASTOLIC BLOOD PRESSURE: 74 MMHG | WEIGHT: 210 LBS | OXYGEN SATURATION: 93 % | HEART RATE: 76 BPM

## 2025-05-13 DIAGNOSIS — S09.90XA HEAD INJURY, INITIAL ENCOUNTER: ICD-10-CM
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
## 2025-05-13 PROCEDURE — 72125 CT NECK SPINE W/O DYE: CPT

## 2025-05-13 PROCEDURE — 99284 EMERGENCY DEPT VISIT MOD MDM: CPT | Mod: 25 | Performed by: STUDENT IN AN ORGANIZED HEALTH CARE EDUCATION/TRAINING PROGRAM

## 2025-05-13 PROCEDURE — 70450 CT HEAD/BRAIN W/O DYE: CPT

## 2025-05-13 PROCEDURE — 71045 X-RAY EXAM CHEST 1 VIEW: CPT | Performed by: INTERNAL MEDICINE

## 2025-05-13 PROCEDURE — 72125 CT NECK SPINE W/O DYE: CPT | Performed by: RADIOLOGY

## 2025-05-13 PROCEDURE — 70450 CT HEAD/BRAIN W/O DYE: CPT | Performed by: RADIOLOGY

## 2025-05-13 PROCEDURE — 71045 X-RAY EXAM CHEST 1 VIEW: CPT

## 2025-05-13 PROCEDURE — G0390 TRAUMA RESPONS W/HOSP CRITI: HCPCS

## 2025-05-13 ASSESSMENT — COLUMBIA-SUICIDE SEVERITY RATING SCALE - C-SSRS
2. HAVE YOU ACTUALLY HAD ANY THOUGHTS OF KILLING YOURSELF?: NO
1. IN THE PAST MONTH, HAVE YOU WISHED YOU WERE DEAD OR WISHED YOU COULD GO TO SLEEP AND NOT WAKE UP?: NO
6. HAVE YOU EVER DONE ANYTHING, STARTED TO DO ANYTHING, OR PREPARED TO DO ANYTHING TO END YOUR LIFE?: NO

## 2025-05-13 ASSESSMENT — PAIN - FUNCTIONAL ASSESSMENT
PAIN_FUNCTIONAL_ASSESSMENT: 0-10

## 2025-05-13 ASSESSMENT — PAIN SCALES - GENERAL
PAINLEVEL_OUTOF10: 0 - NO PAIN

## 2025-05-13 ASSESSMENT — LIFESTYLE VARIABLES
HAVE PEOPLE ANNOYED YOU BY CRITICIZING YOUR DRINKING: NO
HAVE YOU EVER FELT YOU SHOULD CUT DOWN ON YOUR DRINKING: NO
TOTAL SCORE: 0
EVER FELT BAD OR GUILTY ABOUT YOUR DRINKING: NO
EVER HAD A DRINK FIRST THING IN THE MORNING TO STEADY YOUR NERVES TO GET RID OF A HANGOVER: NO

## 2025-05-13 NOTE — ED PROVIDER NOTES
Emergency Department Provider Note       History of Present Illness   History provided by: Patient  Limitations to History: None  External Records Reviewed with Brief Summary: None    HPI:  Lance Mcgrath is a 74 y.o. male presenting to the ED as a HIA Activation after a fall.  Patient has a history of Parkinson disease and uses a walker.  He went to stand up with his walker however the wheels were not locked causing his walker to wheel forward.  This caused him to fall down hitting the side of his head.  No syncope.  He is not complaining of any pain anywhere.  No chest pain, shortness of breath, abdominal pain neck pain or back pain.    Physical Exam   Arrival Vitals:  T    HR    BP    RR    O2        Primary Survey:  Airway: Intact & patent  Breathing: Equal breath sounds bilaterally  Circulation: 2+ radial and DP pulses bilaterally  Disability: GCS  .  Gross motor and sensation intact in the bilateral upper and lower extremities.  Exposure: Patient fully exposed, warm blankets applied    Secondary Survey:  Head: Atraumatic. No cephalohematoma.  Eye: Pupils equal, round, and reactive to light. Gaze is conjugate. No orbital ridge bony step-offs, or tenderness.  ENT:   Midface is stable.  No mandibular tenderness or dental malocclusion's.  There is no nasal bone tenderness or deformity.  No epistaxis.  No blood or CSF drainage and external auditory canals.  No intraoral lesions.  Neck: Cervical collar absent. There is no C-spine midline tenderness to palpation, step-offs, or deformities.  Trachea is midline.  Chest: Clear to auscultation bilaterally, no chest wall tenderness palpation, crepitus, flail segments noted.  No bruising or abrasions noted to the anterior chest wall.  Cardiovascular: Regular rate, rhythm  Abdomen: Soft, nontender, nondistended.  No bruising or lacerations noted.  Not peritonitic.  Pelvis: Stable to compression  : Normal external genitalia, no blood at the urethral meatus  Back/spine:  No midline T or L-spine tenderness palpation, step-offs, or deformities.  No lacerations, abrasions, or bruising noted.  Extremities: No gross bony deformities, no bony tenderness palpation.  Full range of motion all in 4 extremities.  Skin: No lacerations, bruises, abrasions noted.  Neuro: Alert and oriented to person, place, time.  Face symmetric, speech fluent.  Gross motor and sensory function intact in the bilateral upper and lower extremities.      Medical Decision Making & ED Course   Medical Decision Makin y.o. male presenting to the ED as a HIA Activation after fall.  On arrival to the ED, the patient was immediately brought to the resuscitation bay.  Presents to ED after mechanical fall.  We obtained CT imaging chest x-ray which are negative for acute pathology.  Patient able to ambulate at baseline with his walker.  Will discharge patient home.  Advise follow-up with PCP.  --------      EKG Independent Interpretation: EKG not obtained    Independent Result Review and Interpretation: Relevant laboratory and radiographic results were reviewed and independently interpreted by myself.  As necessary, they are commented on in the ED Course.    Social Determinants of Health which Significantly Impact Care: Social Determinants of Health which Significantly Impact Care: None identified     Chronic conditions affecting the patient's care: As documented above in [facility]    The patient was discussed with the following consultants/services: none    Care Considerations: As documented above in [facility]    ED Course:  Diagnoses as of 25 1339   Fall, initial encounter   Head injury, initial encounter         Disposition   As a result of the work-up, the patient was discharged home.  he was informed of his diagnosis and instructed to come back with any concerns or worsening of condition.  he and was agreeable to the plan as discussed above.  he was given the opportunity to ask questions.  All of the patient's questions  were answered.    Procedures   Procedures        Deng Sherman DO  Emergency Medicine                                                            Deng Sherman DO  05/13/25 4447

## 2025-07-06 ENCOUNTER — HOSPITAL ENCOUNTER (EMERGENCY)
Facility: HOSPITAL | Age: 75
Discharge: HOME | End: 2025-07-06
Attending: EMERGENCY MEDICINE
Payer: MEDICARE

## 2025-07-06 ENCOUNTER — APPOINTMENT (OUTPATIENT)
Dept: RADIOLOGY | Facility: HOSPITAL | Age: 75
End: 2025-07-06
Payer: MEDICARE

## 2025-07-06 VITALS
WEIGHT: 202 LBS | DIASTOLIC BLOOD PRESSURE: 74 MMHG | TEMPERATURE: 97.7 F | RESPIRATION RATE: 16 BRPM | HEART RATE: 64 BPM | HEIGHT: 69 IN | BODY MASS INDEX: 29.92 KG/M2 | OXYGEN SATURATION: 100 % | SYSTOLIC BLOOD PRESSURE: 117 MMHG

## 2025-07-06 DIAGNOSIS — S09.90XA INJURY OF HEAD, INITIAL ENCOUNTER: Primary | ICD-10-CM

## 2025-07-06 PROCEDURE — 72125 CT NECK SPINE W/O DYE: CPT | Performed by: RADIOLOGY

## 2025-07-06 PROCEDURE — G0390 TRAUMA RESPONS W/HOSP CRITI: HCPCS

## 2025-07-06 PROCEDURE — 70450 CT HEAD/BRAIN W/O DYE: CPT

## 2025-07-06 PROCEDURE — 99284 EMERGENCY DEPT VISIT MOD MDM: CPT | Mod: 25 | Performed by: EMERGENCY MEDICINE

## 2025-07-06 PROCEDURE — 72125 CT NECK SPINE W/O DYE: CPT

## 2025-07-06 PROCEDURE — 70450 CT HEAD/BRAIN W/O DYE: CPT | Performed by: RADIOLOGY

## 2025-07-06 ASSESSMENT — PAIN - FUNCTIONAL ASSESSMENT
PAIN_FUNCTIONAL_ASSESSMENT: 0-10

## 2025-07-06 ASSESSMENT — PAIN SCALES - GENERAL
PAINLEVEL_OUTOF10: 3
PAINLEVEL_OUTOF10: 2
PAINLEVEL_OUTOF10: 2
PAINLEVEL_OUTOF10: 1
PAINLEVEL_OUTOF10: 2
PAINLEVEL_OUTOF10: 2

## 2025-07-06 ASSESSMENT — LIFESTYLE VARIABLES
TOTAL SCORE: 0
EVER HAD A DRINK FIRST THING IN THE MORNING TO STEADY YOUR NERVES TO GET RID OF A HANGOVER: NO
HAVE YOU EVER FELT YOU SHOULD CUT DOWN ON YOUR DRINKING: NO
EVER FELT BAD OR GUILTY ABOUT YOUR DRINKING: NO
HAVE PEOPLE ANNOYED YOU BY CRITICIZING YOUR DRINKING: NO

## 2025-07-06 ASSESSMENT — PAIN DESCRIPTION - DESCRIPTORS
DESCRIPTORS: ACHING

## 2025-07-06 ASSESSMENT — PAIN DESCRIPTION - LOCATION: LOCATION: HEAD

## 2025-07-06 ASSESSMENT — PAIN DESCRIPTION - PAIN TYPE: TYPE: ACUTE PAIN

## 2025-07-06 NOTE — ED PROVIDER NOTES
HPI   No chief complaint on file.      Patient presents to the emergency department secondary to a fall.  Patient was bending over and lost his balance and fell forward onto his right side.  He hit his head on the floor.  No loss of consciousness.  No neck pain.  No back pain.  No chest pain or shortness of breath.  No cough or congestion.  No other complaints at this time.  Patient has a history of Parkinson's and falls frequently.  He currently wears a bicycle helmet to protect his head.  He is on Eliquis because of history of atrial fibrillation.  They are planning on an ablation and a Watchman procedure to try to get him off of the blood thinners.                          No data recorded                Patient History   Medical History[1]  Surgical History[2]  Family History[3]  Social History[4]    Physical Exam   ED Triage Vitals   Temp Pulse Resp BP   -- -- -- --      SpO2 Temp src Heart Rate Source Patient Position   -- -- -- --      BP Location FiO2 (%)     -- --       Physical Exam  Vitals and nursing note reviewed.   Constitutional:       Appearance: Normal appearance.   HENT:      Head: Normocephalic and atraumatic.      Right Ear: Tympanic membrane normal.      Left Ear: Tympanic membrane normal.      Nose: Nose normal.      Mouth/Throat:      Mouth: Mucous membranes are moist.   Eyes:      Extraocular Movements: Extraocular movements intact.      Pupils: Pupils are equal, round, and reactive to light.   Cardiovascular:      Rate and Rhythm: Normal rate and regular rhythm.      Pulses: Normal pulses.      Heart sounds: Normal heart sounds.   Pulmonary:      Effort: Pulmonary effort is normal.      Breath sounds: Normal breath sounds.   Abdominal:      General: Abdomen is flat. Bowel sounds are normal.      Palpations: Abdomen is soft.      Tenderness: There is no abdominal tenderness. There is no guarding or rebound.   Musculoskeletal:         General: No tenderness or deformity. Normal range of  motion.      Cervical back: Normal range of motion. No tenderness.   Skin:     General: Skin is warm and dry.      Capillary Refill: Capillary refill takes less than 2 seconds.   Neurological:      General: No focal deficit present.      Mental Status: He is alert and oriented to person, place, and time.   Psychiatric:         Mood and Affect: Mood normal.         Behavior: Behavior normal.       Labs Reviewed - No data to display  Pain Management Panel           No data to display              No orders to display     ED Course & MDM   Diagnoses as of 07/06/25 1539   Injury of head, initial encounter       Medical Decision Making  Patient presents to the emergency department secondary to fall on anticoagulation with head injury.  Patient is evaluated in the emergency department with CT head and cervical spine to evaluate for intracranial process.  CT head and cervical spine are negative for acute traumatic process.  No intracranial hemorrhage or C-spine fracture.  Patient is neurologically at his baseline.  He has minimal headache at this time.  Plan is for him to be discharged and follow-up with primary care or return here for any worsening symptoms.        Procedure  Procedures       [1] No past medical history on file.  [2] No past surgical history on file.  [3] No family history on file.  [4]   Social History  Tobacco Use    Smoking status: Never    Smokeless tobacco: Never   Substance Use Topics    Alcohol use: Not on file    Drug use: Not on file        Radha Mary MD  07/06/25 2791